# Patient Record
Sex: FEMALE | Race: OTHER
[De-identification: names, ages, dates, MRNs, and addresses within clinical notes are randomized per-mention and may not be internally consistent; named-entity substitution may affect disease eponyms.]

---

## 2020-05-28 ENCOUNTER — HOSPITAL ENCOUNTER (EMERGENCY)
Dept: HOSPITAL 61 - ER | Age: 25
Discharge: HOME | End: 2020-05-28
Payer: SELF-PAY

## 2020-05-28 VITALS — WEIGHT: 134.48 LBS | BODY MASS INDEX: 25.39 KG/M2 | HEIGHT: 61 IN

## 2020-05-28 VITALS — DIASTOLIC BLOOD PRESSURE: 62 MMHG | SYSTOLIC BLOOD PRESSURE: 113 MMHG

## 2020-05-28 DIAGNOSIS — R10.31: Primary | ICD-10-CM

## 2020-05-28 DIAGNOSIS — R30.0: ICD-10-CM

## 2020-05-28 DIAGNOSIS — F17.200: ICD-10-CM

## 2020-05-28 LAB
APTT PPP: YELLOW S
BACTERIA #/AREA URNS HPF: 0 /HPF
BILIRUB UR QL STRIP: NEGATIVE
FIBRINOGEN PPP-MCNC: CLEAR MG/DL
NITRITE UR QL STRIP: NEGATIVE
PH UR STRIP: 7 [PH]
PROT UR STRIP-MCNC: NEGATIVE MG/DL
RBC #/AREA URNS HPF: 0 /HPF (ref 0–2)
SQUAMOUS #/AREA URNS LPF: (no result) /LPF
UROBILINOGEN UR-MCNC: 1 MG/DL
WBC #/AREA URNS HPF: (no result) /HPF (ref 0–4)

## 2020-05-28 PROCEDURE — 96375 TX/PRO/DX INJ NEW DRUG ADDON: CPT

## 2020-05-28 PROCEDURE — 99284 EMERGENCY DEPT VISIT MOD MDM: CPT

## 2020-05-28 PROCEDURE — 96374 THER/PROPH/DIAG INJ IV PUSH: CPT

## 2020-05-28 PROCEDURE — 81025 URINE PREGNANCY TEST: CPT

## 2020-05-28 PROCEDURE — 81001 URINALYSIS AUTO W/SCOPE: CPT

## 2020-05-28 PROCEDURE — 74176 CT ABD & PELVIS W/O CONTRAST: CPT

## 2020-05-28 NOTE — RAD
PQRS Compliance Statement:

 

One or more of the following individualized dose reduction techniques were

utilized for this examination:  

1. Automated exposure control  

2. Adjustment of the mA and/or kV according to patient size  

3. Use of iterative reconstruction technique

 

 

CT ABDOMEN PELVIS WO CONTRAST

 

Clinical Indication: Reason: right flank pain / Spl. Instructions:  / 

History: 

 

Comparison: None.

 

Technique: Helical CT imaging of the abdomen and pelvis is performed 

without IV or oral contrast.

 

Findings: 

Evaluation of solid organs and bowel is limited without oral and IV 

contrast, decreasing sensitivity for detection of pathology.

 

Lung bases are clear cardiac size normal.

 

The liver, gallbladder, spleen, pancreas, adrenal glands, and abdominal 

aorta are normal. There is no perinephric stranding or hydronephrosis. No 

renal or ureteral calculus is identified.

 

Heterogeneous material mildly distends the stomach. Correlate to recent by

mouth intake. There is no gastric wall thickening. There is no dilated 

small bowel. The appendix is normal. There are several pericecal lymph 

nodes that are upper limits of normal in size. No thickening of the 

terminal ileum is identified. There is no colon wall thickening. The 

distal colon is decompressed, limiting evaluation. No abdominal free 

fluid.

 

There is a left adnexal cyst measuring 5.5 x 3.6 cm. The urinary bladder 

is mostly decompressed, otherwise normal. Uterus is anteverted. No pelvic 

free fluid is seen.

 

No acute bone abnormality.

 

IMPRESSION:

1.  There are a few upper limits of normal in size pericecal lymph nodes. 

Finding could indicate mesenteric adenitis.

2.  The appendix is normal.

3.  No obstructive uropathy.

4.  Moderate sized left adnexal cyst. No pelvic free fluid.

 

Electronically signed by: Donovan Lucero MD (5/28/2020 10:55 PM) [facility]

## 2020-05-28 NOTE — PHYS DOC
Past Medical History


Past Medical History:  No Pertinent History


Past Surgical History:  No Surgical History


Smoking Status:  Current Some Day Smoker


Alcohol Use:  Occasionally





General Adult


EDM:


Chief Complaint:  PAIN ON URINATION





HPI:


HPI:





Patient is a 24-year-old female who presents with a 1 to 2-day history of right 

flank pain that radiates to the right groin.  She has had some dysuria but 

denies any gross hematuria.  She is not had any fever chills or sweats.  She 

denies any nausea or vomiting.  She states she has never really had any pain 

like this before.  She denies any vaginal bleeding or discharge.  Denies any 

dyspareunia.  []





Review of Systems:


Review of Systems:


Constitutional:  Denies fever or chills. []


Eyes:  Denies change in visual acuity. []


HENT:  Denies nasal congestion or sore throat. [] 


Respiratory:  Denies cough or shortness of breath. [] 


Cardiovascular:  Denies chest pain or edema. [] 


GI: Per HPI. [] 


:  Denies dysuria. [] 


Musculoskeletal:  Denies back pain or joint pain. [] 


Integument:  Denies rash. [] 


Neurologic:  Denies headache, focal weakness or sensory changes. [] 


Endocrine:  Denies polyuria or polydipsia. [] 


Lymphatic:  Denies swollen glands. [] 


Psychiatric:  Denies depression or anxiety. []





Heart Score:


Risk Factors:


Risk Factors:  DM, Current or recent (<one month) smoker, HTN, HLP, family 

history of CAD, obesity.


Risk Scores:


Score 0 - 3:  2.5% MACE over next 6 weeks - Discharge Home


Score 4 - 6:  20.3% MACE over next 6 weeks - Admit for Clinical Observation


Score 7 - 10:  72.7% MACE over next 6 weeks - Early Invasive Strategies





Current Medications:





Current Medications








 Medications


  (Trade)  Dose


 Ordered  Sig/[facility]  Start Time


 Stop Time Status Last Admin


Dose Admin


 


 Ketorolac


 Tromethamine


  (Toradol 30mg


 Vial)  30 mg  1X  ONCE  5/28/20 22:30


 5/28/20 22:31 DC 5/28/20 22:30


30 MG


 


 Ondansetron HCl


  (Zofran)  4 mg  1X  ONCE  5/28/20 22:30


 5/28/20 22:31 DC 5/28/20 22:30


4 MG


 


 Sodium Chloride  1,000 ml @ 


 1,000 mls/hr  1X  ONCE  5/28/20 22:30


 5/28/20 23:29   














Allergies:


Allergies:





Allergies








Coded Allergies Type Severity Reaction Last Updated Verified


 


  No Known Drug Allergies    5/28/20 No











Physical Exam:


PE:





Constitutional: Well developed, well nourished, no acute distress, non-toxic 

appearance. []


HENT: Normocephalic, atraumatic, bilateral external ears normal, oropharynx 

moist, no oral exudates, nose normal. []


Eyes: PERRLA, EOMI, conjunctiva normal, no discharge. [] 


Neck: Normal range of motion, no tenderness, supple, no stridor. [] 


Cardiovascular:Heart rate regular rhythm, no murmur []


Lungs & Thorax:  Bilateral breath sounds clear to auscultation []


Abdomen: Bowel sounds normal, soft, no tenderness, no masses, no pulsatile 

masses. [] 


Skin: Warm, dry, no erythema, no rash. [] 


Back: No tenderness, no CVA tenderness. [] 


Extremities: No tenderness, no cyanosis, no clubbing, ROM intact, no edema. [] 


Neurologic: Alert and oriented X 3, normal motor function, normal sensory 

function, no focal deficits noted. []


Psychologic: Affect normal, judgement normal, mood normal. []





Current Patient Data:


Labs:





                                Laboratory Tests








Test


 5/28/20


21:29 5/28/20


21:32


 


Urine Collection Type Unknown   


 


Urine Color Yellow   


 


Urine Clarity Clear   


 


Urine pH


 7.0 (<5.0-8.0)


 





 


Urine Specific Gravity


 1.025


(1.000-1.030) 





 


Urine Protein


 Negative mg/dL


(NEG-TRACE) 





 


Urine Glucose (UA)


 Negative mg/dL


(NEG) 





 


Urine Ketones (Stick)


 Negative mg/dL


(NEG) 





 


Urine Blood


 Negative (NEG)


 





 


Urine Nitrite


 Negative (NEG)


 





 


Urine Bilirubin


 Negative (NEG)


 





 


Urine Urobilinogen Dipstick


 1.0 mg/dL (0.2


mg/dL) 





 


Urine Leukocyte Esterase


 Negative (NEG)


 





 


Urine RBC 0 /HPF (0-2)   


 


Urine WBC


 Occ /HPF (0-4)


 





 


Urine Squamous Epithelial


Cells Few /LPF  


 





 


Urine Bacteria


 0 /HPF (0-FEW)


 





 


POC Urine HCG, Qualitative


 


 Hcg negative


(Negative)








Vital Signs:





                                   Vital Signs








  Date Time  Temp Pulse Resp B/P (MAP) Pulse Ox O2 Delivery O2 Flow Rate FiO2


 


5/28/20 22:00 99.1 82 16 115/69 (84) 99 Room Air  





 99.1       











EKG:


EKG:


[]





Radiology/Procedures:


Radiology/Procedures:


[]


Impression:


PROCEDURE: CT ABDOMEN PELVIS WO CONTRAST





PQRS Compliance Statement:


 


One or more of the following individualized dose reduction techniques were


utilized for this examination:  


1. Automated exposure control  


2. Adjustment of the mA and/or kV according to patient size  


3. Use of iterative reconstruction technique


 


 


CT ABDOMEN PELVIS WO CONTRAST


 


Clinical Indication: Reason: right flank pain / Spl. Instructions:  / 


History: 


 


Comparison: None.


 


Technique: Helical CT imaging of the abdomen and pelvis is performed 


without IV or oral contrast.


 


Findings: 


Evaluation of solid organs and bowel is limited without oral and IV 


contrast, decreasing sensitivity for detection of pathology.


 


Lung bases are clear cardiac size normal.


 


The liver, gallbladder, spleen, pancreas, adrenal glands, and abdominal 


aorta are normal. There is no perinephric stranding or hydronephrosis. No 


renal or ureteral calculus is identified.


 


Heterogeneous material mildly distends the stomach. Correlate to recent by


mouth intake. There is no gastric wall thickening. There is no dilated 


small bowel. The appendix is normal. There are several pericecal lymph 


nodes that are upper limits of normal in size. No thickening of the 


terminal ileum is identified. There is no colon wall thickening. The 


distal colon is decompressed, limiting evaluation. No abdominal free 


fluid.


 


There is a left adnexal cyst measuring 5.5 x 3.6 cm. The urinary bladder 


is mostly decompressed, otherwise normal. Uterus is anteverted. No pelvic 


free fluid is seen.


 


No acute bone abnormality.


 


IMPRESSION:


1.  There are a few upper limits of normal in size pericecal lymph nodes. 


Finding could indicate mesenteric adenitis.


2.  The appendix is normal.


3.  No obstructive uropathy.


4.  Moderate sized left adnexal cyst. No pelvic free fluid.





Course & Med Decision Making:


Course & Med Decision Making


Pertinent Labs and Imaging studies reviewed. (See chart for details)





[ED course: Evaluation reveals a 24-year-old female with some right flank pain. 

 She was given IV fluids and Toradol which did help alleviate her symptoms.  I 

let her know that we did not see anything serious on her CT scan and her urine 

looked clear.  Going to give her an anti-inflammatory.]





Dragon Disclaimer:


Dragon Disclaimer:


This electronic medical record was generated, in whole or in part, using a voice

recognition dictation system.





Departure


Departure


Impression:  


   Primary Impression:  


   Flank pain


Disposition:  01 HOME, SELF-CARE


Condition:  IMPROVED


Referrals:  


NO PCP (PCP)


Patient Instructions:  Flank Pain





Additional Instructions:  


Return to the emergency department with any new or concerning symptoms


Scripts


Naproxen (NAPROXEN) 500 Mg Tablet


1 TAB PO BID PRN for PAIN, #30 TAB 1 Refill


   Prov: KATTY WEBBER DO         5/28/20











KATTY WEBBER DO             May 28, 2020 23:05